# Patient Record
Sex: MALE | Race: ASIAN | Employment: FULL TIME | URBAN - METROPOLITAN AREA
[De-identification: names, ages, dates, MRNs, and addresses within clinical notes are randomized per-mention and may not be internally consistent; named-entity substitution may affect disease eponyms.]

---

## 2022-07-19 ENCOUNTER — OFFICE VISIT (OUTPATIENT)
Dept: ORTHOPEDIC SURGERY | Age: 57
End: 2022-07-19
Payer: COMMERCIAL

## 2022-07-19 DIAGNOSIS — M51.36 DDD (DEGENERATIVE DISC DISEASE), LUMBAR: ICD-10-CM

## 2022-07-19 DIAGNOSIS — M48.062 SPINAL STENOSIS OF LUMBAR REGION WITH NEUROGENIC CLAUDICATION: ICD-10-CM

## 2022-07-19 DIAGNOSIS — M54.16 LUMBAR RADICULITIS: ICD-10-CM

## 2022-07-19 DIAGNOSIS — M47.816 LUMBAR SPONDYLOSIS: ICD-10-CM

## 2022-07-19 DIAGNOSIS — M54.50 LUMBAR BACK PAIN: Primary | ICD-10-CM

## 2022-07-19 PROCEDURE — 99204 OFFICE O/P NEW MOD 45 MIN: CPT | Performed by: PHYSICIAN ASSISTANT

## 2022-07-19 NOTE — PROGRESS NOTES
Chucky Guy (: 1965) is a 65 y.o. male, new  patient, here for evaluation of the following chief complaint(s):  Back Pain         SUBJECTIVE/OBJECTIVE:    Chucky Guy (: 1965) is a 65 y.o. male who presents for evaluation of lumbar back pain. The patient localizes pain in the bilateral lumbar region with radiation to the left lateral hip, lateral thigh to the level of the knee but not below. Patient states symptoms were worse with standing and walking, bending and twisting. Patient states he works as a  at [employer] and states standing 8 hours a day causes severe pain. Patient states at times his pain is so severe he has difficulty getting out of bed in the morning. The patient denies lower extremity numbness, tingling or weakness. Patient does not take any pain medication on a routine basis. Patient states at times his pain level is as high as a 8/10. The patient denies saddle paraesthesias/ anaesthesia. No flowsheet data found. ROS    The patient denies fevers, chills, chest pain, shortness of breath, nausea, vomiting. Positive for musculoskeletal issues as described in the HPI. Vitals: There were no vitals taken for this visit. There is no height or weight on file to calculate BMI. PHYSICAL EXAM:    The patient is alert and oriented x3 and in no acute distress. Patient ambulates with a normal gait without gait aids. Sensory testing in all major nerve distributions in the lower extremities is intact and symmetric. Manual motor testing of the major muscle groups of the lower extremities including dorsiflexion/EHL/ plantarflexion, knee flexion/extension, hip flexion/extension/abduction/ adduction is intact and symmetric in the bilateral lower extremities. Deep tendon reflexes +1/4 and symmetric in the bilateral knees and ankles. Hip range of motion is pain-free bilaterally. Negative straight leg raise bilaterally. No evidence of clonus bilaterally.  Babinski's downgoing and symmetric bilaterally. Distal pulses intact and symmetric bilaterally. There is no tenderness to palpation of the thoracic, lumbar or sacral regions. IMAGING:    XR Results (most recent):  Results from Appointment encounter on 07/19/22    XR SPINE LUMB MIN 4 V    Narrative  AP, lateral, flexion and extension view digital radiographs of the lumbar spine obtained in the office today were reviewed and demonstrate severe disc height loss L5-S1. There is evidence of moderate spondylosis L4-5 and moderate-severe spondylosis at L5-S1 where there is evidence of foraminal encroachment. There are no gross motion segments seen on flexion/extension views. There is no evidence of fracture, lytic lesion or acute bony abnormality. MRI Results (most recent):  No results found for this or any previous visit. Not on File      No current outpatient medications on file. No current facility-administered medications for this visit. History reviewed. No pertinent past medical history. History reviewed. No pertinent surgical history. History reviewed. No pertinent family history. Social History     Tobacco Use    Smoking status: Unknown If Ever Smoked    Smokeless tobacco: Never Used   Substance Use Topics    Alcohol use: Not on file    Drug use: Not on file                 ASSESSMENT/PLAN:      1. Lumbar back pain  -     XR SPINE LUMB MIN 4 V; Future  -     REFERRAL TO PHYSICAL THERAPY  2. Spinal stenosis of lumbar region with neurogenic claudication  -     REFERRAL TO PHYSICAL THERAPY  3. DDD (degenerative disc disease), lumbar  -     REFERRAL TO PHYSICAL THERAPY  4. Lumbar spondylosis  -     REFERRAL TO PHYSICAL THERAPY  5. Lumbar radiculitis  -     REFERRAL TO PHYSICAL THERAPY      Below is the assessment and plan developed based on review of pertinent history, physical exam, labs, studies, and medications.     Have discussed the patients diagnosis and radiographic findings at length and have answered all patient questions to her questions to his satisfaction. Have provided the patient with a prescription for outpatient physical therapy for core strengthening, modalities and instruction in a home exercise program.  Will plan on seeing the patient back for reevaluation in 6 weeks time should symptoms persist or worsen. The patient understands and agrees to the treatment plan as outlined above. Return in about 6 weeks (around 8/30/2022) for Assess progress with treatment plan. Dr. Nam Roman was available for immediate consult during this encounter. An electronic signature was used to authenticate this note.     -- Ray Dudley PA-C